# Patient Record
Sex: FEMALE | Race: WHITE | NOT HISPANIC OR LATINO | Employment: OTHER | URBAN - METROPOLITAN AREA
[De-identification: names, ages, dates, MRNs, and addresses within clinical notes are randomized per-mention and may not be internally consistent; named-entity substitution may affect disease eponyms.]

---

## 2018-03-14 ENCOUNTER — TRANSFERRED RECORDS (OUTPATIENT)
Dept: HEALTH INFORMATION MANAGEMENT | Facility: CLINIC | Age: 63
End: 2018-03-14

## 2019-02-27 ENCOUNTER — TELEPHONE (OUTPATIENT)
Dept: DERMATOLOGY | Facility: CLINIC | Age: 64
End: 2019-02-27

## 2019-02-27 NOTE — TELEPHONE ENCOUNTER
[facility] Call Center    Phone Message    May a detailed message be left on voicemail: yes    Reason for Call: Other: Pt would like to inquire more about the other clinics in [facility] that Dr Soto works at.  Please have Dr Nunez staff call the Pt.     Action Taken: Message routed to:  [facility] dermatology\  
67

## 2019-02-28 ENCOUNTER — DOCUMENTATION ONLY (OUTPATIENT)
Dept: CARE COORDINATION | Facility: CLINIC | Age: 64
End: 2019-02-28

## 2019-03-29 ENCOUNTER — OFFICE VISIT (OUTPATIENT)
Dept: DERMATOLOGY | Facility: CLINIC | Age: 64
End: 2019-03-29
Payer: COMMERCIAL

## 2019-03-29 DIAGNOSIS — L90.5 SKIN SCAR: ICD-10-CM

## 2019-03-29 DIAGNOSIS — Z86.018 HISTORY OF DYSPLASTIC NEVUS: Primary | ICD-10-CM

## 2019-03-29 DIAGNOSIS — L30.9 DERMATITIS: ICD-10-CM

## 2019-03-29 DIAGNOSIS — D22.9 MULTIPLE BENIGN MELANOCYTIC NEVI: ICD-10-CM

## 2019-03-29 DIAGNOSIS — L50.9 HIVES: ICD-10-CM

## 2019-03-29 RX ORDER — TRIAMCINOLONE ACETONIDE 1 MG/G
OINTMENT TOPICAL 2 TIMES DAILY
Qty: 80 G | Refills: 3 | Status: SHIPPED | OUTPATIENT
Start: 2019-03-29 | End: 2021-08-03

## 2019-03-29 RX ORDER — AMLODIPINE BESYLATE 5 MG/1
TABLET ORAL
COMMUNITY
Start: 2019-03-05

## 2019-03-29 RX ORDER — CYCLOSPORINE 0.5 MG/ML
EMULSION OPHTHALMIC
COMMUNITY
Start: 2019-02-21

## 2019-03-29 RX ORDER — ESCITALOPRAM OXALATE 10 MG/1
TABLET ORAL
COMMUNITY
Start: 2019-03-05

## 2019-03-29 RX ORDER — CELECOXIB 200 MG/1
CAPSULE ORAL
COMMUNITY
Start: 2019-03-05

## 2019-03-29 RX ORDER — LOSARTAN POTASSIUM 50 MG/1
50 TABLET ORAL DAILY
COMMUNITY

## 2019-03-29 RX ORDER — ESTRADIOL 0.1 MG/G
CREAM VAGINAL
COMMUNITY
Start: 2010-09-15 | End: 2020-07-19

## 2019-03-29 RX ORDER — HYDROCHLOROTHIAZIDE 12.5 MG/1
25 TABLET ORAL DAILY
COMMUNITY

## 2019-03-29 ASSESSMENT — PAIN SCALES - GENERAL: PAINLEVEL: NO PAIN (0)
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
## 2019-03-29 NOTE — NURSING NOTE
"Chief Complaint   Patient presents with     Skin Check     Kalie is here today for a skin check. No particular concerns today. Has had \"pre-cancerous cell excisisions\" in the past by her Derm in [place].       Brittney Li LPN    "
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
## 2019-04-03 ENCOUNTER — RECORDS - HEALTHEAST (OUTPATIENT)
Dept: LAB | Facility: CLINIC | Age: 64
End: 2019-04-03

## 2019-04-03 LAB
ANION GAP SERPL CALCULATED.3IONS-SCNC: 10 MMOL/L (ref 5–18)
BUN SERPL-MCNC: 11 MG/DL (ref 8–22)
CALCIUM SERPL-MCNC: 10 MG/DL (ref 8.5–10.5)
CHLORIDE BLD-SCNC: 103 MMOL/L (ref 98–107)
CHOLEST SERPL-MCNC: 260 MG/DL
CO2 SERPL-SCNC: 27 MMOL/L (ref 22–31)
CREAT SERPL-MCNC: 0.76 MG/DL (ref 0.6–1.1)
FASTING STATUS PATIENT QL REPORTED: ABNORMAL
GFR SERPL CREATININE-BSD FRML MDRD: >60 ML/MIN/1.73M2
GLUCOSE BLD-MCNC: 87 MG/DL (ref 70–125)
HDLC SERPL-MCNC: 55 MG/DL
LDLC SERPL CALC-MCNC: 178 MG/DL
POTASSIUM BLD-SCNC: 3.9 MMOL/L (ref 3.5–5)
SODIUM SERPL-SCNC: 140 MMOL/L (ref 136–145)
TRIGL SERPL-MCNC: 135 MG/DL
TSH SERPL DL<=0.005 MIU/L-ACNC: 2.53 UIU/ML (ref 0.3–5)

## 2019-04-19 ENCOUNTER — HEALTH MAINTENANCE LETTER (OUTPATIENT)
Age: 64
End: 2019-04-19

## 2019-11-20 ENCOUNTER — RECORDS - HEALTHEAST (OUTPATIENT)
Dept: LAB | Facility: CLINIC | Age: 64
End: 2019-11-20

## 2019-11-20 LAB — POTASSIUM BLD-SCNC: 4 MMOL/L (ref 3.5–5)

## 2020-03-11 ENCOUNTER — HEALTH MAINTENANCE LETTER (OUTPATIENT)
Age: 65
End: 2020-03-11

## 2020-03-18 ENCOUNTER — MYC MEDICAL ADVICE (OUTPATIENT)
Dept: DERMATOLOGY | Facility: CLINIC | Age: 65
End: 2020-03-18

## 2020-03-19 ENCOUNTER — DOCUMENTATION ONLY (OUTPATIENT)
Dept: CARE COORDINATION | Facility: CLINIC | Age: 65
End: 2020-03-19

## 2020-05-20 ENCOUNTER — RECORDS - HEALTHEAST (OUTPATIENT)
Dept: LAB | Facility: CLINIC | Age: 65
End: 2020-05-20

## 2020-05-20 LAB
ANION GAP SERPL CALCULATED.3IONS-SCNC: 9 MMOL/L (ref 5–18)
BUN SERPL-MCNC: 9 MG/DL (ref 8–22)
CALCIUM SERPL-MCNC: 10.3 MG/DL (ref 8.5–10.5)
CHLORIDE BLD-SCNC: 99 MMOL/L (ref 98–107)
CHOLEST SERPL-MCNC: 320 MG/DL
CO2 SERPL-SCNC: 29 MMOL/L (ref 22–31)
CREAT SERPL-MCNC: 0.79 MG/DL (ref 0.6–1.1)
FASTING STATUS PATIENT QL REPORTED: ABNORMAL
GFR SERPL CREATININE-BSD FRML MDRD: >60 ML/MIN/1.73M2
GLUCOSE BLD-MCNC: 87 MG/DL (ref 70–125)
HDLC SERPL-MCNC: 66 MG/DL
LDLC SERPL CALC-MCNC: 212 MG/DL
POTASSIUM BLD-SCNC: 3.8 MMOL/L (ref 3.5–5)
SODIUM SERPL-SCNC: 137 MMOL/L (ref 136–145)
TRIGL SERPL-MCNC: 211 MG/DL

## 2020-05-29 ENCOUNTER — RECORDS - HEALTHEAST (OUTPATIENT)
Dept: LAB | Facility: CLINIC | Age: 65
End: 2020-05-29

## 2020-05-29 LAB
CHOLEST SERPL-MCNC: 314 MG/DL
FASTING STATUS PATIENT QL REPORTED: YES
HDLC SERPL-MCNC: 56 MG/DL
LDLC SERPL CALC-MCNC: 219 MG/DL
TRIGL SERPL-MCNC: 197 MG/DL

## 2020-07-17 ENCOUNTER — OFFICE VISIT (OUTPATIENT)
Dept: DERMATOLOGY | Facility: CLINIC | Age: 65
End: 2020-07-17
Payer: COMMERCIAL

## 2020-07-17 DIAGNOSIS — Z86.018 HISTORY OF DYSPLASTIC NEVUS: ICD-10-CM

## 2020-07-17 DIAGNOSIS — L82.1 SEBORRHEIC KERATOSIS: ICD-10-CM

## 2020-07-17 DIAGNOSIS — L82.0 INFLAMED SEBORRHEIC KERATOSIS: Primary | ICD-10-CM

## 2020-07-17 DIAGNOSIS — D22.9 MULTIPLE BENIGN MELANOCYTIC NEVI: ICD-10-CM

## 2020-07-17 RX ORDER — ATORVASTATIN CALCIUM 20 MG/1
10 TABLET, FILM COATED ORAL
COMMUNITY
Start: 2020-06-24

## 2020-07-17 ASSESSMENT — PAIN SCALES - GENERAL: PAINLEVEL: NO PAIN (0)

## 2020-07-17 NOTE — PROGRESS NOTES
[facility] Dermatology Note      Dermatology Problem List:  1. Hx of dysplastic nevi, removed by outside derm in [place]  2. Multiple clinically benign nevi  3. Mild hand dermatitis, resolved  - used OTC HC PRN  - used  triamcinolone 0.1% BID PRN  4. Seborrheic keratosis  Has multiple SK on breasts and right medial elbow  - Cryotherapy of right medial elbow 1 SK    Encounter Date: Jul 17, 2020    CC:   FBSE     History of Present Illness:  Ms. Kalie Hoang is a 64 year old female who has a h/o dysplastic nevi, removed by dermatologist in [place], presenting for yearly visit.    She was last seen for FBSE on 3/2019, at which point she had mild hand dermatitis and was told to use OTC HC PRN and triamcinolone 0.1% BID daily. She had multiple clinically benign nevi on her face, trunk and extremities. She was also concerned cosmetically about her anterior neck scar from an emergent surgery, for which cosmetic derm consult was placed for possible revision/treatment options.    Today, has no complaints of hand dermatitis, has not needed OTC HC or triamcinolone 0.1% BID daily. Patient thinks that winter dryness that may have caused the hand issue. She also had dry lips and used triamcinolone ointment, after which the dry lips went away.     She has a new lesion (brown raised roughy, scaly lesion) on her right medial elbow, which per  has been there for 1-2 years. Also has a brown oily raised lesion on her right upper breast, which was noticed 1-2 months ago. Both new lesions are non-tender.    She ended up not going for scar revision surgery, and is using acupuncture for the redness, which helps.     Past Medical History:   There is no problem list on file for this patient.    No past medical history on file.  No past surgical history on file.    Social History:  Patient reports that she has never smoked. She has never used smokeless tobacco.    Family History:  No family history on  file.    Medications:  Current Outpatient Medications   Medication Sig Dispense Refill     amLODIPine (NORVASC) 5 MG tablet        celecoxib (CELEBREX) 200 MG capsule        escitalopram (LEXAPRO) 10 MG tablet        esomeprazole (NEXIUM) 20 MG DR capsule        estradiol (ESTRACE VAGINAL) 0.1 MG/GM vaginal cream        hydrochlorothiazide (HYDRODIURIL) 12.5 MG tablet Take 25 mg by mouth daily       losartan (COZAAR) 50 MG tablet Take 50 mg by mouth daily       RESTASIS 0.05 % ophthalmic emulsion        triamcinolone (KENALOG) 0.1 % external ointment Apply topically 2 times daily 80 g 3        Allergies   Allergen Reactions     Bactrim      Ciprofloxacin Muscle Pain (Myalgia)     Macrobid  [Nitrofurantoin Macrocrystal] Muscle Pain (Myalgia)     Codeine Palpitations       Review of Systems:  -Constitutional: Otherwise feeling well today, in usual state of health.  -HEENT: Patient denies nonhealing oral sores.  -Skin: As above in HPI. No additional skin concerns.  Patient in normal state of health and is feeling well on complete ROS. No fevers, cough, rash, GI upset, headaches, rhinorrhea, or other rashes.      Physical exam:  Vitals: There were no vitals taken for this visit.  GEN: This is a well developed, well-nourished female in no acute distress, in a pleasant mood.   NEURO: A/Ox3  MS: No joint deformity  PSYCH: Normal mood/affect  HEENT: Conjunctivae clear  Pulm: Breathing comfortably on RA  Abd: No abdominal distension  CV: Extremities warm and well perfused     SKIN: Total skin excluding the undergarment areas was performed. The exam included the head/face, neck, both arms, chest, back, abdomen, both legs, digits and/or nails.   - FP II  - There are dome shaped bright red papules on the trunk.   - Scarring of midline anterior neck 2/2 previous emergent neck surgery  - Well-healed scars in areas of previous dysplastic nevi excisions (4 total)  - Multiple regular brown pigmented macules and papules <6 mm are  identified on the trunk, extremities without concerning findings on dermoscopy.   - Multiple skin tags in extremities and back  - There are fine lines and dyspigmentation on sun exposed areas of the face and chest.  - Scattered brown macules on sun exposed areas.  - There are waxy stuck on tan to brown papules on the trunk, extremities, and breasts  - No other lesions of concern on areas examined.     Impression/Plan:  1. Multiple clinically benign nevi on the face, trunk, extremities    ABCDs of melanoma were discussed and self skin checks were advised. , Sun precaution was advised including the use of sun screens of SPF 30 or higher, sun protective clothing, and avoidance of tanning beds.     2. Hx of dysplastic nevi, removed by dermatologist in [place]  Records scanned.     Sun avoidance, sunscreen use as above     3. Mild hand dermatitis, resolved    4. Seborrheic keratoses: Benign hyperkeratotic papules and plaques. No treatment advised unless irritation occurs.     5. Irritated seborrheic keratosis x1 Lesion(s) on the elbow treated with liquid nitrogen x2 10 sec freeze cycles. Wound info provided.        CC Referred Self, MD  No address on file on close of this encounter.    Follow-up 1 year.      This patient was staffed and discussed with Dr. Soto.    Tori Fletcher MD  PGY2  IM resident  [phone]    I have personally examined this patient and agree with Dr. Fletcher's documentation and plan of care. I have reviewed and amended the resident's note above. The documentation accurately reflects my clinical observations, diagnoses, treatment and follow-up plans. I performed the procedure(s).     Tori Soto MD  Dermatology Staff

## 2020-07-17 NOTE — LETTER
7/17/2020       RE: Kalie Hoang  [address]  [place] [address]     Dear Colleague,    Thank you for referring your patient, Kalie Hoang, to the [facility] DERMATOLOGY at [facility]. Please see a copy of my visit note below.    [facility] Dermatology Note      Dermatology Problem List:  1. Hx of dysplastic nevi, removed by outside derm in [place]  2. Multiple clinically benign nevi  3. Mild hand dermatitis, resolved  - used OTC HC PRN  - used  triamcinolone 0.1% BID PRN  4. Seborrheic keratosis  Has multiple SK on breasts and right medial elbow  - Cryotherapy of right medial elbow 1 SK    Encounter Date: Jul 17, 2020    CC:   FBSE     History of Present Illness:  Ms. Kalie Hoang is a 64 year old female who has a h/o dysplastic nevi, removed by dermatologist in [place], presenting for yearly visit.    She was last seen for FBSE on 3/2019, at which point she had mild hand dermatitis and was told to use OTC HC PRN and triamcinolone 0.1% BID daily. She had multiple clinically benign nevi on her face, trunk and extremities. She was also concerned cosmetically about her anterior neck scar from an emergent surgery, for which cosmetic derm consult was placed for possible revision/treatment options.    Today, has no complaints of hand dermatitis, has not needed OTC HC or triamcinolone 0.1% BID daily. Patient thinks that winter dryness that may have caused the hand issue. She also had dry lips and used triamcinolone ointment, after which the dry lips went away.     She has a new lesion (brown raised roughy, scaly lesion) on her right medial elbow, which per  has been there for 1-2 years. Also has a brown oily raised lesion on her right upper breast, which was noticed 1-2 months ago. Both new lesions are non-tender.    She ended up not going for scar revision surgery, and is using acupuncture for the redness, which helps.     Past  Medical History:   There is no problem list on file for this patient.    No past medical history on file.  No past surgical history on file.    Social History:  Patient reports that she has never smoked. She has never used smokeless tobacco.    Family History:  No family history on file.    Medications:  Current Outpatient Medications   Medication Sig Dispense Refill     amLODIPine (NORVASC) 5 MG tablet        celecoxib (CELEBREX) 200 MG capsule        escitalopram (LEXAPRO) 10 MG tablet        esomeprazole (NEXIUM) 20 MG DR capsule        estradiol (ESTRACE VAGINAL) 0.1 MG/GM vaginal cream        hydrochlorothiazide (HYDRODIURIL) 12.5 MG tablet Take 25 mg by mouth daily       losartan (COZAAR) 50 MG tablet Take 50 mg by mouth daily       RESTASIS 0.05 % ophthalmic emulsion        triamcinolone (KENALOG) 0.1 % external ointment Apply topically 2 times daily 80 g 3        Allergies   Allergen Reactions     Bactrim      Ciprofloxacin Muscle Pain (Myalgia)     Macrobid  [Nitrofurantoin Macrocrystal] Muscle Pain (Myalgia)     Codeine Palpitations       Review of Systems:  -Constitutional: Otherwise feeling well today, in usual state of health.  -HEENT: Patient denies nonhealing oral sores.  -Skin: As above in HPI. No additional skin concerns.  Patient in normal state of health and is feeling well on complete ROS. No fevers, cough, rash, GI upset, headaches, rhinorrhea, or other rashes.      Physical exam:  Vitals: There were no vitals taken for this visit.  GEN: This is a well developed, well-nourished female in no acute distress, in a pleasant mood.   NEURO: A/Ox3  MS: No joint deformity  PSYCH: Normal mood/affect  HEENT: Conjunctivae clear  Pulm: Breathing comfortably on RA  Abd: No abdominal distension  CV: Extremities warm and well perfused     SKIN: Total skin excluding the undergarment areas was performed. The exam included the head/face, neck, both arms, chest, back, abdomen, both legs, digits and/or nails.   -  FP II  - There are dome shaped bright red papules on the trunk.   - Scarring of midline anterior neck 2/2 previous emergent neck surgery  - Well-healed scars in areas of previous dysplastic nevi excisions (4 total)  - Multiple regular brown pigmented macules and papules <6 mm are identified on the trunk, extremities without concerning findings on dermoscopy.   - Multiple skin tags in extremities and back  - There are fine lines and dyspigmentation on sun exposed areas of the face and chest.  - Scattered brown macules on sun exposed areas.  - There are waxy stuck on tan to brown papules on the trunk, extremities, and breasts  - No other lesions of concern on areas examined.     Impression/Plan:  1. Multiple clinically benign nevi on the face, trunk, extremities    ABCDs of melanoma were discussed and self skin checks were advised. , Sun precaution was advised including the use of sun screens of SPF 30 or higher, sun protective clothing, and avoidance of tanning beds.     2. Hx of dysplastic nevi, removed by dermatologist in [place]  Records scanned.     Sun avoidance, sunscreen use as above     3. Mild hand dermatitis, resolved    4. Seborrheic keratoses: Benign hyperkeratotic papules and plaques. No treatment advised unless irritation occurs.     5. Irritated seborrheic keratosis x1 Lesion(s) on the elbow treated with liquid nitrogen x2 10 sec freeze cycles. Wound info provided.        CC Referred Self, MD  No address on file on close of this encounter.    Follow-up 1 year.      This patient was staffed and discussed with Dr. Soto.    Tori Fletcher MD  PGY2  IM resident  [phone]    I have personally examined this patient and agree with Dr. Fletcher's documentation and plan of care. I have reviewed and amended the resident's note above. The documentation accurately reflects my clinical observations, diagnoses, treatment and follow-up plans. I performed the procedure(s).     Tori Soto MD  Dermatology  Staff      Again, thank you for allowing me to participate in the care of your patient.      Sincerely,    Tori Soto MD

## 2020-07-17 NOTE — PATIENT INSTRUCTIONS
You were seen for full skin exam today. Thanks for coming.     We froze off your seborrheic keratosis (on your right arm). They will fall off in the coming days. Discussed sun precautions was advised including the use of sun screens of SPF 30 or higher, sun protective clothing, and avoidance of tanning beds, since you have had history of dysplastic nevi taken out in [place].

## 2020-07-17 NOTE — NURSING NOTE
Chief Complaint   Patient presents with     Derm Problem     Kalie is here today for a skin check, pt states has concern on her right inner elbow     Mojgan Hall LPN

## 2020-11-18 ENCOUNTER — RECORDS - HEALTHEAST (OUTPATIENT)
Dept: LAB | Facility: CLINIC | Age: 65
End: 2020-11-18

## 2020-11-18 LAB
ANION GAP SERPL CALCULATED.3IONS-SCNC: 11 MMOL/L (ref 5–18)
BUN SERPL-MCNC: 14 MG/DL (ref 8–22)
CALCIUM SERPL-MCNC: 9.9 MG/DL (ref 8.5–10.5)
CHLORIDE BLD-SCNC: 101 MMOL/L (ref 98–107)
CHOLEST SERPL-MCNC: 178 MG/DL
CO2 SERPL-SCNC: 27 MMOL/L (ref 22–31)
CREAT SERPL-MCNC: 0.75 MG/DL (ref 0.6–1.1)
FASTING STATUS PATIENT QL REPORTED: ABNORMAL
GFR SERPL CREATININE-BSD FRML MDRD: >60 ML/MIN/1.73M2
GLUCOSE BLD-MCNC: 93 MG/DL (ref 70–125)
HDLC SERPL-MCNC: 63 MG/DL
LDLC SERPL CALC-MCNC: 82 MG/DL
POTASSIUM BLD-SCNC: 4.2 MMOL/L (ref 3.5–5)
SODIUM SERPL-SCNC: 139 MMOL/L (ref 136–145)
TRIGL SERPL-MCNC: 166 MG/DL

## 2021-01-03 ENCOUNTER — HEALTH MAINTENANCE LETTER (OUTPATIENT)
Age: 66
End: 2021-01-03

## 2021-04-25 ENCOUNTER — HEALTH MAINTENANCE LETTER (OUTPATIENT)
Age: 66
End: 2021-04-25

## 2021-08-03 ENCOUNTER — OFFICE VISIT (OUTPATIENT)
Dept: DERMATOLOGY | Facility: CLINIC | Age: 66
End: 2021-08-03
Payer: COMMERCIAL

## 2021-08-03 DIAGNOSIS — L50.9 HIVES: ICD-10-CM

## 2021-08-03 DIAGNOSIS — D22.9 MULTIPLE BENIGN MELANOCYTIC NEVI: ICD-10-CM

## 2021-08-03 DIAGNOSIS — B35.1 ONYCHOMYCOSIS: Primary | ICD-10-CM

## 2021-08-03 DIAGNOSIS — L82.1 SEBORRHEIC KERATOSIS: ICD-10-CM

## 2021-08-03 DIAGNOSIS — Z86.018 HISTORY OF DYSPLASTIC NEVUS: ICD-10-CM

## 2021-08-03 PROCEDURE — 99214 OFFICE O/P EST MOD 30 MIN: CPT | Performed by: DERMATOLOGY

## 2021-08-03 RX ORDER — CICLOPIROX 80 MG/ML
SOLUTION TOPICAL
Qty: 6.6 ML | Refills: 3 | Status: SHIPPED | OUTPATIENT
Start: 2021-08-03 | End: 2023-12-05

## 2021-08-03 RX ORDER — TRIAMCINOLONE ACETONIDE 1 MG/G
OINTMENT TOPICAL
Qty: 80 G | Refills: 3 | Status: SHIPPED | OUTPATIENT
Start: 2021-08-03

## 2021-08-03 RX ORDER — CETIRIZINE HYDROCHLORIDE 10 MG/1
10 TABLET ORAL DAILY
COMMUNITY

## 2021-08-03 NOTE — LETTER
8/3/2021      RE: Kalie Hoang  [address]  [place] [address]               [facility] Dermatology Note      Dermatology Problem List:  1. Hx of dysplastic nevi, removed by outside derm in [place]  2. Multiple clinically benign nevi  3. Mild hand dermatitis, resolved  - used OTC HC PRN  - used  triamcinolone 0.1% BID PRN  4. Seborrheic keratosis  Has multiple SK on breasts and right medial elbow  - Cryotherapy of right medial elbow 1 SEBORRHEIC KERATOSIS  5. Onychomycosis      Encounter Date: Aug 3, 2021    CC:   FBSE     History of Present Illness:  Ms. Kalie Hoang is a 65 year old female who has a h/o dysplastic nevi, removed by dermatologist in [place], presenting for yearly visit. Last seen in 7/2020. Notes no significant skin changes. Has lip dermatitis that tends to flare with lipstick. Notes that the hand dermatitis is  Improved. Had a presumed bug bite on the L thigh that seems to now be crusted.     Past Medical History:   There is no problem list on file for this patient.    History reviewed. No pertinent past medical history.  History reviewed. No pertinent surgical history.    Social History:  Patient reports that she has never smoked. She has never used smokeless tobacco.    Family History:  History reviewed. No pertinent family history.    Medications:  Current Outpatient Medications   Medication Sig Dispense Refill     amLODIPine (NORVASC) 5 MG tablet        atorvastatin (LIPITOR) 20 MG tablet 10 mg        celecoxib (CELEBREX) 200 MG capsule        cetirizine (ZYRTEC) 10 MG tablet Take 10 mg by mouth daily       escitalopram (LEXAPRO) 10 MG tablet        esomeprazole (NEXIUM) 20 MG DR capsule        hydrochlorothiazide (HYDRODIURIL) 12.5 MG tablet Take 25 mg by mouth daily       losartan (COZAAR) 50 MG tablet Take 50 mg by mouth daily       RESTASIS 0.05 % ophthalmic emulsion        triamcinolone (KENALOG) 0.1 % external ointment Apply topically 2  times daily 80 g 3        Allergies   Allergen Reactions     Bactrim      Ciprofloxacin Muscle Pain (Myalgia)     Macrobid  [Nitrofurantoin Macrocrystal] Muscle Pain (Myalgia)     Codeine Palpitations       Review of Systems:  -Constitutional: Otherwise feeling well today, in usual state of health.  -HEENT: Patient denies nonhealing oral sores.  -Skin: As above in HPI. No additional skin concerns.  Patient in normal state of health and is feeling well on complete ROS. No fevers, cough, rash, GI upset, headaches, rhinorrhea, or other rashes.      Physical exam:  Vitals: There were no vitals taken for this visit.  GEN: This is a well developed, well-nourished female in no acute distress, in a pleasant mood.   NEURO: A/Ox3  MS: No joint deformity  PSYCH: Normal mood/affect  HEENT: Conjunctivae clear  Pulm: Breathing comfortably on RA  Abd: No abdominal distension  CV: Extremities warm and well perfused     SKIN: Total skin excluding the undergarment areas was performed. The exam included the head/face, neck, both arms, chest, back, abdomen, both legs, digits and/or nails.   - FP II  - There are dome shaped bright red papules on the trunk.   - Scarring of midline anterior neck 2/2 previous emergent neck surgery  - Well-healed scars in areas of previous dysplastic nevi excisions (4 total)  - Multiple regular brown pigmented macules and papules <6 mm are identified on the trunk, extremities without concerning findings on dermoscopy.   - L medial breast with approx 8 mm brown papule with dark brown macules at the inferior aspect  - Multiple skin tags in extremities and back  - There are fine lines and dyspigmentation on sun exposed areas of the face and chest.  - Scattered brown macules on sun exposed areas.  - There are waxy stuck on tan to brown papules on the trunk, extremities, and breasts  - No other lesions of concern on areas examined.   - Pink crusted papule on the L lateral thigh 2 mm   - L great toenail plates  with distal onycholysis    Impression/Plan:  1. Multiple clinically benign nevi on the face, trunk, extremities  ABCDs of melanoma were discussed and self skin checks were advised. , Sun precaution was advised including the use of sun screens of SPF 30 or higher, sun protective clothing, and avoidance of tanning beds.    2. History of dysplastic nevus, no recurrence noted. Monitoring lesion on the L breast, stable for many years per patient.     3. Onychomycosis: Pt previously on oral terbinafine with poor side-effect profile. Rx provided for Penlac, noted that this would not result in cure, but likely would slow spread.     4. Seborrheic keratoses: Benign hyperkeratotic papules and plaques. No treatment advised unless irritation occurs.        CC Referred Self, MD  No address on file on close of this encounter.    Follow-up 1 year.        Tori Soto MD  Dermatology Staff

## 2021-08-03 NOTE — LETTER
8/3/2021      RE: Kalie Hoang  [address]  [place] [address]               [facility] Dermatology Note      Dermatology Problem List:  1. Hx of dysplastic nevi, removed by outside derm in [place]  2. Multiple clinically benign nevi  3. Mild hand dermatitis, resolved  - used OTC HC PRN  - used  triamcinolone 0.1% BID PRN  4. Seborrheic keratosis  Has multiple SK on breasts and right medial elbow  - Cryotherapy of right medial elbow 1 SEBORRHEIC KERATOSIS  5. Onychomycosis      Encounter Date: Aug 3, 2021    CC:   FBSE     History of Present Illness:  Ms. Kalie Hoang is a 65 year old female who has a h/o dysplastic nevi, removed by dermatologist in [place], presenting for yearly visit. Last seen in 7/2020. Notes no significant skin changes. Has lip dermatitis that tends to flare with lipstick. Notes that the hand dermatitis is  Improved. Had a presumed bug bite on the L thigh that seems to now be crusted.     Past Medical History:   There is no problem list on file for this patient.    History reviewed. No pertinent past medical history.  History reviewed. No pertinent surgical history.    Social History:  Patient reports that she has never smoked. She has never used smokeless tobacco.    Family History:  History reviewed. No pertinent family history.    Medications:  Current Outpatient Medications   Medication Sig Dispense Refill     amLODIPine (NORVASC) 5 MG tablet        atorvastatin (LIPITOR) 20 MG tablet 10 mg        celecoxib (CELEBREX) 200 MG capsule        cetirizine (ZYRTEC) 10 MG tablet Take 10 mg by mouth daily       escitalopram (LEXAPRO) 10 MG tablet        esomeprazole (NEXIUM) 20 MG DR capsule        hydrochlorothiazide (HYDRODIURIL) 12.5 MG tablet Take 25 mg by mouth daily       losartan (COZAAR) 50 MG tablet Take 50 mg by mouth daily       RESTASIS 0.05 % ophthalmic emulsion        triamcinolone (KENALOG) 0.1 % external ointment Apply topically 2  times daily 80 g 3        Allergies   Allergen Reactions     Bactrim      Ciprofloxacin Muscle Pain (Myalgia)     Macrobid  [Nitrofurantoin Macrocrystal] Muscle Pain (Myalgia)     Codeine Palpitations       Review of Systems:  -Constitutional: Otherwise feeling well today, in usual state of health.  -HEENT: Patient denies nonhealing oral sores.  -Skin: As above in HPI. No additional skin concerns.  Patient in normal state of health and is feeling well on complete ROS. No fevers, cough, rash, GI upset, headaches, rhinorrhea, or other rashes.      Physical exam:  Vitals: There were no vitals taken for this visit.  GEN: This is a well developed, well-nourished female in no acute distress, in a pleasant mood.   NEURO: A/Ox3  MS: No joint deformity  PSYCH: Normal mood/affect  HEENT: Conjunctivae clear  Pulm: Breathing comfortably on RA  Abd: No abdominal distension  CV: Extremities warm and well perfused     SKIN: Total skin excluding the undergarment areas was performed. The exam included the head/face, neck, both arms, chest, back, abdomen, both legs, digits and/or nails.   - FP II  - There are dome shaped bright red papules on the trunk.   - Scarring of midline anterior neck 2/2 previous emergent neck surgery  - Well-healed scars in areas of previous dysplastic nevi excisions (4 total)  - Multiple regular brown pigmented macules and papules <6 mm are identified on the trunk, extremities without concerning findings on dermoscopy.   - L medial breast with approx 8 mm brown papule with dark brown macules at the inferior aspect  - Multiple skin tags in extremities and back  - There are fine lines and dyspigmentation on sun exposed areas of the face and chest.  - Scattered brown macules on sun exposed areas.  - There are waxy stuck on tan to brown papules on the trunk, extremities, and breasts  - No other lesions of concern on areas examined.   - Pink crusted papule on the L lateral thigh 2 mm   - L great toenail plates  with distal onycholysis    Impression/Plan:  1. Multiple clinically benign nevi on the face, trunk, extremities  ABCDs of melanoma were discussed and self skin checks were advised. , Sun precaution was advised including the use of sun screens of SPF 30 or higher, sun protective clothing, and avoidance of tanning beds.    2. History of dysplastic nevus, no recurrence noted. Monitoring lesion on the L breast, stable for many years per patient.     3. Onychomycosis: Pt previously on oral terbinafine with poor side-effect profile. Rx provided for Penlac, noted that this would not result in cure, but likely would slow spread.     4. Seborrheic keratoses: Benign hyperkeratotic papules and plaques. No treatment advised unless irritation occurs.        CC Referred Self, MD  No address on file on close of this encounter.    Follow-up 1 year.        Tori Soto MD  Dermatology Staff        Tori Soto MD

## 2021-08-03 NOTE — PROGRESS NOTES
[facility] Dermatology Note      Dermatology Problem List:  1. Hx of dysplastic nevi, removed by outside derm in [place]  2. Multiple clinically benign nevi  3. Mild hand dermatitis, resolved  - used OTC HC PRN  - used  triamcinolone 0.1% BID PRN  4. Seborrheic keratosis  Has multiple SK on breasts and right medial elbow  - Cryotherapy of right medial elbow 1 SEBORRHEIC KERATOSIS  5. Onychomycosis      Encounter Date: Aug 3, 2021    CC:   FBSE     History of Present Illness:  Ms. Kalie Hoang is a 65 year old female who has a h/o dysplastic nevi, removed by dermatologist in [place], presenting for yearly visit. Last seen in 7/2020. Notes no significant skin changes. Has lip dermatitis that tends to flare with lipstick. Notes that the hand dermatitis is  Improved. Had a presumed bug bite on the L thigh that seems to now be crusted.     Past Medical History:   There is no problem list on file for this patient.    History reviewed. No pertinent past medical history.  History reviewed. No pertinent surgical history.    Social History:  Patient reports that she has never smoked. She has never used smokeless tobacco.    Family History:  History reviewed. No pertinent family history.    Medications:  Current Outpatient Medications   Medication Sig Dispense Refill     amLODIPine (NORVASC) 5 MG tablet        atorvastatin (LIPITOR) 20 MG tablet 10 mg        celecoxib (CELEBREX) 200 MG capsule        cetirizine (ZYRTEC) 10 MG tablet Take 10 mg by mouth daily       escitalopram (LEXAPRO) 10 MG tablet        esomeprazole (NEXIUM) 20 MG DR capsule        hydrochlorothiazide (HYDRODIURIL) 12.5 MG tablet Take 25 mg by mouth daily       losartan (COZAAR) 50 MG tablet Take 50 mg by mouth daily       RESTASIS 0.05 % ophthalmic emulsion        triamcinolone (KENALOG) 0.1 % external ointment Apply topically 2 times daily 80 g 3        Allergies   Allergen Reactions     Bactrim      Ciprofloxacin  Muscle Pain (Myalgia)     Macrobid  [Nitrofurantoin Macrocrystal] Muscle Pain (Myalgia)     Codeine Palpitations       Review of Systems:  -Constitutional: Otherwise feeling well today, in usual state of health.  -HEENT: Patient denies nonhealing oral sores.  -Skin: As above in HPI. No additional skin concerns.  Patient in normal state of health and is feeling well on complete ROS. No fevers, cough, rash, GI upset, headaches, rhinorrhea, or other rashes.      Physical exam:  Vitals: There were no vitals taken for this visit.  GEN: This is a well developed, well-nourished female in no acute distress, in a pleasant mood.   NEURO: A/Ox3  MS: No joint deformity  PSYCH: Normal mood/affect  HEENT: Conjunctivae clear  Pulm: Breathing comfortably on RA  Abd: No abdominal distension  CV: Extremities warm and well perfused     SKIN: Total skin excluding the undergarment areas was performed. The exam included the head/face, neck, both arms, chest, back, abdomen, both legs, digits and/or nails.   - FP II  - There are dome shaped bright red papules on the trunk.   - Scarring of midline anterior neck 2/2 previous emergent neck surgery  - Well-healed scars in areas of previous dysplastic nevi excisions (4 total)  - Multiple regular brown pigmented macules and papules <6 mm are identified on the trunk, extremities without concerning findings on dermoscopy.   - L medial breast with approx 8 mm brown papule with dark brown macules at the inferior aspect  - Multiple skin tags in extremities and back  - There are fine lines and dyspigmentation on sun exposed areas of the face and chest.  - Scattered brown macules on sun exposed areas.  - There are waxy stuck on tan to brown papules on the trunk, extremities, and breasts  - No other lesions of concern on areas examined.   - Pink crusted papule on the L lateral thigh 2 mm   - L great toenail plates with distal onycholysis    Impression/Plan:  1. Multiple clinically benign nevi on the  face, trunk, extremities  ABCDs of melanoma were discussed and self skin checks were advised. , Sun precaution was advised including the use of sun screens of SPF 30 or higher, sun protective clothing, and avoidance of tanning beds.    2. History of dysplastic nevus, no recurrence noted. Monitoring lesion on the L breast, stable for many years per patient.     3. Onychomycosis: Pt previously on oral terbinafine with poor side-effect profile. Rx provided for Penlac, noted that this would not result in cure, but likely would slow spread.     4. Seborrheic keratoses: Benign hyperkeratotic papules and plaques. No treatment advised unless irritation occurs.        CC Referred Self, MD  No address on file on close of this encounter.    Follow-up 1 year.        Tori Soto MD  Dermatology Staff

## 2021-08-03 NOTE — PATIENT INSTRUCTIONS
Pediatric Dermatology  [facility]  [address]  1st [place]  [address]  Phone: [phone]    Pediatric & Adult Dermatology  [facility]  [address]   2nd [address]  [facility] [address]  [phone]                  General information: Dr. Tori Soto is a board-certified dermatologist with subspecialty certification in pediatric dermatology.     Scheduling and Nurse Triage: Dr. Soto sees pediatric patients on Mondays in [place] and adult and pediatric patients on Tuesdays in [place]. The remainder of the week she practices at the [facility]. Please call the above phone numbers to schedule or to talk to a nurse.     -For scheduling at the [place] or [place] locations, or to talk to the triage nurse please call the above phone number at the clinic where you were seen.     -For medication refills, please call your pharmacy.

## 2021-10-10 ENCOUNTER — HEALTH MAINTENANCE LETTER (OUTPATIENT)
Age: 66
End: 2021-10-10

## 2021-11-17 ENCOUNTER — LAB REQUISITION (OUTPATIENT)
Dept: LAB | Facility: CLINIC | Age: 66
End: 2021-11-17
Payer: COMMERCIAL

## 2021-11-17 DIAGNOSIS — M85.80 OTHER SPECIFIED DISORDERS OF BONE DENSITY AND STRUCTURE, UNSPECIFIED SITE: ICD-10-CM

## 2021-11-17 DIAGNOSIS — E78.2 MIXED HYPERLIPIDEMIA: ICD-10-CM

## 2021-11-17 DIAGNOSIS — I10 ESSENTIAL (PRIMARY) HYPERTENSION: ICD-10-CM

## 2021-11-17 LAB
ANION GAP SERPL CALCULATED.3IONS-SCNC: 12 MMOL/L (ref 5–18)
BUN SERPL-MCNC: 10 MG/DL (ref 8–22)
CALCIUM SERPL-MCNC: 9.7 MG/DL (ref 8.5–10.5)
CHLORIDE BLD-SCNC: 101 MMOL/L (ref 98–107)
CHOLEST SERPL-MCNC: 167 MG/DL
CO2 SERPL-SCNC: 25 MMOL/L (ref 22–31)
CREAT SERPL-MCNC: 0.82 MG/DL (ref 0.6–1.1)
GFR SERPL CREATININE-BSD FRML MDRD: 75 ML/MIN/1.73M2
GLUCOSE BLD-MCNC: 96 MG/DL (ref 70–125)
HDLC SERPL-MCNC: 61 MG/DL
LDLC SERPL CALC-MCNC: 74 MG/DL
POTASSIUM BLD-SCNC: 3.9 MMOL/L (ref 3.5–5)
SODIUM SERPL-SCNC: 138 MMOL/L (ref 136–145)
TRIGL SERPL-MCNC: 158 MG/DL

## 2021-11-17 PROCEDURE — 80048 BASIC METABOLIC PNL TOTAL CA: CPT | Mod: ORL | Performed by: FAMILY MEDICINE

## 2021-11-17 PROCEDURE — 82652 VIT D 1 25-DIHYDROXY: CPT | Mod: ORL | Performed by: FAMILY MEDICINE

## 2021-11-17 PROCEDURE — 82306 VITAMIN D 25 HYDROXY: CPT | Mod: ORL | Performed by: FAMILY MEDICINE

## 2021-11-17 PROCEDURE — 80061 LIPID PANEL: CPT | Mod: ORL | Performed by: FAMILY MEDICINE

## 2021-11-18 LAB
1,25(OH)2D SERPL-MCNC: 34.9 PG/ML (ref 19.9–79.3)
DEPRECATED CALCIDIOL+CALCIFEROL SERPL-MC: 49 UG/L (ref 30–80)

## 2021-12-05 ENCOUNTER — HEALTH MAINTENANCE LETTER (OUTPATIENT)
Age: 66
End: 2021-12-05

## 2022-05-18 ENCOUNTER — LAB REQUISITION (OUTPATIENT)
Dept: LAB | Facility: CLINIC | Age: 67
End: 2022-05-18
Payer: COMMERCIAL

## 2022-05-18 DIAGNOSIS — R53.83 OTHER FATIGUE: ICD-10-CM

## 2022-05-18 LAB
FERRITIN SERPL-MCNC: 9 NG/ML (ref 10–130)
VIT B12 SERPL-MCNC: 987 PG/ML (ref 213–816)

## 2022-05-18 PROCEDURE — 82728 ASSAY OF FERRITIN: CPT | Mod: ORL | Performed by: FAMILY MEDICINE

## 2022-05-18 PROCEDURE — 82607 VITAMIN B-12: CPT | Mod: ORL | Performed by: FAMILY MEDICINE

## 2022-05-21 ENCOUNTER — HEALTH MAINTENANCE LETTER (OUTPATIENT)
Age: 67
End: 2022-05-21

## 2022-09-18 ENCOUNTER — HEALTH MAINTENANCE LETTER (OUTPATIENT)
Age: 67
End: 2022-09-18

## 2022-11-08 ENCOUNTER — OFFICE VISIT (OUTPATIENT)
Dept: DERMATOLOGY | Facility: CLINIC | Age: 67
End: 2022-11-08
Payer: COMMERCIAL

## 2022-11-08 DIAGNOSIS — Z86.018 HISTORY OF DYSPLASTIC NEVUS: ICD-10-CM

## 2022-11-08 DIAGNOSIS — D22.9 MULTIPLE BENIGN MELANOCYTIC NEVI: Primary | ICD-10-CM

## 2022-11-08 DIAGNOSIS — B35.1 ONYCHOMYCOSIS: ICD-10-CM

## 2022-11-08 PROCEDURE — 99213 OFFICE O/P EST LOW 20 MIN: CPT | Performed by: DERMATOLOGY

## 2022-11-08 RX ORDER — ALPRAZOLAM 0.25 MG/1
TABLET, ORALLY DISINTEGRATING ORAL
COMMUNITY

## 2022-11-08 RX ORDER — DICYCLOMINE HYDROCHLORIDE 10 MG/1
10 CAPSULE ORAL PRN
COMMUNITY

## 2022-11-08 RX ORDER — FAMOTIDINE 40 MG/1
40 TABLET, FILM COATED ORAL PRN
COMMUNITY

## 2022-11-08 NOTE — PATIENT INSTRUCTIONS
Pediatric Dermatology  [facility]  [address]  1st [place]  [address]  Phone: [phone]    Pediatric & Adult Dermatology  [facility]  [address]   2nd Floor  [facility] [address]  [phone]                  General information: Dr. Tori Soto is a board-certified dermatologist with subspecialty certification in pediatric dermatology.     Scheduling and Nurse Triage: Dr. Soto sees pediatric patients on Mondays in [place] and adult and pediatric patients on Tuesdays in [place]. The remainder of the week she practices at the [facility]. Please call the above phone numbers to schedule or to talk to a nurse.     -For scheduling at the [place] or [place] locations, or to talk to the triage nurse please call the above phone number at the clinic where you were seen.     -For medication refills, please call your pharmacy.

## 2022-11-08 NOTE — PROGRESS NOTES
[facility] Dermatology Note      Dermatology Problem List:  1. Hx of dysplastic nevi, removed by outside derm in [place]  2. Multiple clinically benign nevi  3. Mild hand dermatitis, resolved  - used OTC HC PRN  - used  triamcinolone 0.1% BID PRN  4. Seborrheic keratosis  Has multiple SK on breasts and right medial elbow  - Cryotherapy of right medial elbow 1 SEBORRHEIC KERATOSIS  5. Onychomycosis      Encounter Date: Nov 8, 2022    CC:   FBSE     History of Present Illness:  Ms. Kalie Hoang is a 66 year old female who has a h/o dysplastic nevi, removed by dermatologist in [place], presenting for yearly visit. No lesions of concern. Using penlac on the nails, but not sure if this is helping.     Past Medical History:   There is no problem list on file for this patient.    History reviewed. No pertinent past medical history.  History reviewed. No pertinent surgical history.    Social History:  Patient reports that she has never smoked. She has never used smokeless tobacco.    Family History:  History reviewed. No pertinent family history.    Medications:  Current Outpatient Medications   Medication Sig Dispense Refill     amLODIPine (NORVASC) 5 MG tablet        atorvastatin (LIPITOR) 20 MG tablet 10 mg        celecoxib (CELEBREX) 200 MG capsule        cetirizine (ZYRTEC) 10 MG tablet Take 10 mg by mouth daily       dicyclomine (BENTYL) 10 MG capsule Take 10 mg by mouth as needed       escitalopram (LEXAPRO) 10 MG tablet        esomeprazole (NEXIUM) 20 MG DR capsule        famotidine (PEPCID) 40 MG tablet Take 40 mg by mouth as needed for heartburn       hydrochlorothiazide (HYDRODIURIL) 12.5 MG tablet Take 25 mg by mouth daily       losartan (COZAAR) 50 MG tablet Take 50 mg by mouth daily       nystatin (MYCOSTATIN) 622918 UNIT/GM external cream Twice daily to rash under breasts until clear. 30 g 3     RESTASIS 0.05 % ophthalmic emulsion        ALPRAZolam (XANAX) 0.25 MG ODT 1/2  tab(s)       ciclopirox (PENLAC) 8 % external solution Apply to adjacent skin and affected nails daily.  Remove with alcohol every 7 days, then repeat. (Patient not taking: Reported on 11/8/2022) 6.6 mL 3     triamcinolone (KENALOG) 0.1 % external ointment Twice daily to hand rash areas until clear, then as needed. Do not fill until pt calls. (Patient not taking: Reported on 11/8/2022) 80 g 3        Allergies   Allergen Reactions     Bactrim      Ciprofloxacin Muscle Pain (Myalgia)     Macrobid  [Nitrofurantoin Macrocrystal] Muscle Pain (Myalgia)     Codeine Palpitations       Review of Systems:  -Constitutional: Otherwise feeling well today, in usual state of health.  -HEENT: Patient denies nonhealing oral sores.  -Skin: As above in HPI. No additional skin concerns.  Patient in normal state of health and is feeling well on complete ROS. No fevers, cough, rash, GI upset, headaches, rhinorrhea, or other rashes.      Physical exam:  Vitals: There were no vitals taken for this visit.  GEN: This is a well developed, well-nourished female in no acute distress, in a pleasant mood.      SKIN: Total skin excluding the undergarment areas was performed. The exam included the head/face, neck, both arms, chest, back, abdomen, both legs, digits and/or nails.   - FP II  - There are dome shaped bright red papules on the trunk.   - Scarring of midline anterior neck 2/2 previous emergent neck surgery  - Well-healed scars in areas of previous dysplastic nevi excisions (4 total)  - Multiple regular brown pigmented macules and papules <6 mm are identified on the trunk, extremities without concerning findings on dermoscopy.   - L medial breast with approx 8 mm brown papule with dark brown macules at the inferior aspect  - Scattered brown macules on sun exposed areas.  - There are waxy stuck on tan to brown papules on the trunk, extremities, and breasts  - No other lesions of concern on areas examined.    - L great toenail plates with  lateral onycholysis    Impression/Plan:  1. Multiple clinically benign nevi on the face, trunk, extremities  ABCDs of melanoma were discussed and self skin checks were advised. , Sun precaution was advised including the use of sun screens of SPF 30 or higher, sun protective clothing, and avoidance of tanning beds.    2. History of dysplastic nevus, no recurrence noted. Monitoring lesion on the L breast, stable for many years per patient.     3. Onychomycosis: Pt previously on oral terbinafine with poor side-effect profile. Will stop penlac for now.     4. Seborrheic keratoses: Benign hyperkeratotic papules and plaques. No treatment advised unless irritation occurs.        CC Referred Self, MD  No address on file on close of this encounter.    Follow-up 1 year.    Tori Soto MD  Dermatology Staff

## 2022-11-08 NOTE — LETTER
11/8/2022      RE: Kalie Hoang  [address]  [place] [address]     Dear Colleague,    Thank you for the opportunity to participate in the care of your patient, Kalie Hoang, at the [facility] at [facility]. Please see a copy of my visit note below.      [facility] Dermatology Note      Dermatology Problem List:  1. Hx of dysplastic nevi, removed by outside derm in [place]  2. Multiple clinically benign nevi  3. Mild hand dermatitis, resolved  - used OTC HC PRN  - used  triamcinolone 0.1% BID PRN  4. Seborrheic keratosis  Has multiple SK on breasts and right medial elbow  - Cryotherapy of right medial elbow 1 SEBORRHEIC KERATOSIS  5. Onychomycosis      Encounter Date: Nov 8, 2022    CC:   FBSE     History of Present Illness:  Ms. Kalie Hoang is a 66 year old female who has a h/o dysplastic nevi, removed by dermatologist in [place], presenting for yearly visit. No lesions of concern. Using penlac on the nails, but not sure if this is helping.     Past Medical History:   There is no problem list on file for this patient.    History reviewed. No pertinent past medical history.  History reviewed. No pertinent surgical history.    Social History:  Patient reports that she has never smoked. She has never used smokeless tobacco.    Family History:  History reviewed. No pertinent family history.    Medications:  Current Outpatient Medications   Medication Sig Dispense Refill     amLODIPine (NORVASC) 5 MG tablet        atorvastatin (LIPITOR) 20 MG tablet 10 mg        celecoxib (CELEBREX) 200 MG capsule        cetirizine (ZYRTEC) 10 MG tablet Take 10 mg by mouth daily       dicyclomine (BENTYL) 10 MG capsule Take 10 mg by mouth as needed       escitalopram (LEXAPRO) 10 MG tablet        esomeprazole (NEXIUM) 20 MG DR capsule        famotidine (PEPCID) 40 MG tablet Take 40 mg by mouth as needed for heartburn        hydrochlorothiazide (HYDRODIURIL) 12.5 MG tablet Take 25 mg by mouth daily       losartan (COZAAR) 50 MG tablet Take 50 mg by mouth daily       nystatin (MYCOSTATIN) 528466 UNIT/GM external cream Twice daily to rash under breasts until clear. 30 g 3     RESTASIS 0.05 % ophthalmic emulsion        ALPRAZolam (XANAX) 0.25 MG ODT 1/2 tab(s)       ciclopirox (PENLAC) 8 % external solution Apply to adjacent skin and affected nails daily.  Remove with alcohol every 7 days, then repeat. (Patient not taking: Reported on 11/8/2022) 6.6 mL 3     triamcinolone (KENALOG) 0.1 % external ointment Twice daily to hand rash areas until clear, then as needed. Do not fill until pt calls. (Patient not taking: Reported on 11/8/2022) 80 g 3        Allergies   Allergen Reactions     Bactrim      Ciprofloxacin Muscle Pain (Myalgia)     Macrobid  [Nitrofurantoin Macrocrystal] Muscle Pain (Myalgia)     Codeine Palpitations       Review of Systems:  -Constitutional: Otherwise feeling well today, in usual state of health.  -HEENT: Patient denies nonhealing oral sores.  -Skin: As above in HPI. No additional skin concerns.  Patient in normal state of health and is feeling well on complete ROS. No fevers, cough, rash, GI upset, headaches, rhinorrhea, or other rashes.      Physical exam:  Vitals: There were no vitals taken for this visit.  GEN: This is a well developed, well-nourished female in no acute distress, in a pleasant mood.      SKIN: Total skin excluding the undergarment areas was performed. The exam included the head/face, neck, both arms, chest, back, abdomen, both legs, digits and/or nails.   - FP II  - There are dome shaped bright red papules on the trunk.   - Scarring of midline anterior neck 2/2 previous emergent neck surgery  - Well-healed scars in areas of previous dysplastic nevi excisions (4 total)  - Multiple regular brown pigmented macules and papules <6 mm are identified on the trunk, extremities without concerning  findings on dermoscopy.   - L medial breast with approx 8 mm brown papule with dark brown macules at the inferior aspect  - Scattered brown macules on sun exposed areas.  - There are waxy stuck on tan to brown papules on the trunk, extremities, and breasts  - No other lesions of concern on areas examined.    - L great toenail plates with lateral onycholysis    Impression/Plan:  1. Multiple clinically benign nevi on the face, trunk, extremities  ABCDs of melanoma were discussed and self skin checks were advised. , Sun precaution was advised including the use of sun screens of SPF 30 or higher, sun protective clothing, and avoidance of tanning beds.    2. History of dysplastic nevus, no recurrence noted. Monitoring lesion on the L breast, stable for many years per patient.     3. Onychomycosis: Pt previously on oral terbinafine with poor side-effect profile. Will stop penlac for now.     4. Seborrheic keratoses: Benign hyperkeratotic papules and plaques. No treatment advised unless irritation occurs.        CC Referred Self, MD  No address on file on close of this encounter.    Follow-up 1 year.    Tori Soto MD  Dermatology Staff

## 2022-11-16 ENCOUNTER — LAB REQUISITION (OUTPATIENT)
Dept: LAB | Facility: CLINIC | Age: 67
End: 2022-11-16
Payer: COMMERCIAL

## 2022-11-16 DIAGNOSIS — E78.2 MIXED HYPERLIPIDEMIA: ICD-10-CM

## 2022-11-16 DIAGNOSIS — I10 ESSENTIAL (PRIMARY) HYPERTENSION: ICD-10-CM

## 2022-11-16 PROCEDURE — 80061 LIPID PANEL: CPT | Mod: ORL | Performed by: FAMILY MEDICINE

## 2022-11-16 PROCEDURE — 80048 BASIC METABOLIC PNL TOTAL CA: CPT | Mod: ORL | Performed by: FAMILY MEDICINE

## 2022-11-17 LAB
ANION GAP SERPL CALCULATED.3IONS-SCNC: 12 MMOL/L (ref 7–15)
BUN SERPL-MCNC: 11.8 MG/DL (ref 8–23)
CALCIUM SERPL-MCNC: 9.7 MG/DL (ref 8.8–10.2)
CHLORIDE SERPL-SCNC: 101 MMOL/L (ref 98–107)
CHOLEST SERPL-MCNC: 172 MG/DL
CREAT SERPL-MCNC: 0.77 MG/DL (ref 0.51–0.95)
DEPRECATED HCO3 PLAS-SCNC: 26 MMOL/L (ref 22–29)
GFR SERPL CREATININE-BSD FRML MDRD: 84 ML/MIN/1.73M2
GLUCOSE SERPL-MCNC: 96 MG/DL (ref 70–99)
HDLC SERPL-MCNC: 69 MG/DL
LDLC SERPL CALC-MCNC: 77 MG/DL
NONHDLC SERPL-MCNC: 103 MG/DL
POTASSIUM SERPL-SCNC: 4.2 MMOL/L (ref 3.4–5.3)
SODIUM SERPL-SCNC: 139 MMOL/L (ref 136–145)
TRIGL SERPL-MCNC: 130 MG/DL

## 2023-05-17 ENCOUNTER — LAB REQUISITION (OUTPATIENT)
Dept: LAB | Facility: CLINIC | Age: 68
End: 2023-05-17
Payer: COMMERCIAL

## 2023-05-17 DIAGNOSIS — I10 ESSENTIAL (PRIMARY) HYPERTENSION: ICD-10-CM

## 2023-05-17 PROCEDURE — 84132 ASSAY OF SERUM POTASSIUM: CPT | Mod: ORL | Performed by: FAMILY MEDICINE

## 2023-05-18 LAB — POTASSIUM SERPL-SCNC: 4.1 MMOL/L (ref 3.4–5.3)

## 2023-06-04 ENCOUNTER — HEALTH MAINTENANCE LETTER (OUTPATIENT)
Age: 68
End: 2023-06-04

## 2023-09-20 ENCOUNTER — LAB REQUISITION (OUTPATIENT)
Dept: LAB | Facility: CLINIC | Age: 68
End: 2023-09-20
Payer: COMMERCIAL

## 2023-09-20 DIAGNOSIS — I10 ESSENTIAL (PRIMARY) HYPERTENSION: ICD-10-CM

## 2023-09-20 DIAGNOSIS — E78.2 MIXED HYPERLIPIDEMIA: ICD-10-CM

## 2023-09-20 LAB
ANION GAP SERPL CALCULATED.3IONS-SCNC: 14 MMOL/L (ref 7–15)
BUN SERPL-MCNC: 15.7 MG/DL (ref 8–23)
CALCIUM SERPL-MCNC: 9.6 MG/DL (ref 8.8–10.2)
CHLORIDE SERPL-SCNC: 99 MMOL/L (ref 98–107)
CHOLEST SERPL-MCNC: 198 MG/DL
CREAT SERPL-MCNC: 0.72 MG/DL (ref 0.51–0.95)
DEPRECATED HCO3 PLAS-SCNC: 23 MMOL/L (ref 22–29)
EGFRCR SERPLBLD CKD-EPI 2021: >90 ML/MIN/1.73M2
GLUCOSE SERPL-MCNC: 93 MG/DL (ref 70–99)
HDLC SERPL-MCNC: 68 MG/DL
LDLC SERPL CALC-MCNC: 103 MG/DL
NONHDLC SERPL-MCNC: 130 MG/DL
POTASSIUM SERPL-SCNC: 4 MMOL/L (ref 3.4–5.3)
SODIUM SERPL-SCNC: 136 MMOL/L (ref 136–145)
TRIGL SERPL-MCNC: 133 MG/DL

## 2023-09-20 PROCEDURE — 80061 LIPID PANEL: CPT | Mod: ORL | Performed by: FAMILY MEDICINE

## 2023-09-20 PROCEDURE — 80048 BASIC METABOLIC PNL TOTAL CA: CPT | Mod: ORL | Performed by: FAMILY MEDICINE

## 2023-12-05 ENCOUNTER — OFFICE VISIT (OUTPATIENT)
Dept: DERMATOLOGY | Facility: CLINIC | Age: 68
End: 2023-12-05
Payer: COMMERCIAL

## 2023-12-05 DIAGNOSIS — Z86.018 HISTORY OF DYSPLASTIC NEVUS: Primary | ICD-10-CM

## 2023-12-05 DIAGNOSIS — B35.1 ONYCHOMYCOSIS: ICD-10-CM

## 2023-12-05 DIAGNOSIS — D22.9 MULTIPLE BENIGN MELANOCYTIC NEVI: ICD-10-CM

## 2023-12-05 PROCEDURE — 99214 OFFICE O/P EST MOD 30 MIN: CPT | Performed by: DERMATOLOGY

## 2023-12-05 RX ORDER — CICLOPIROX 80 MG/ML
SOLUTION TOPICAL
Qty: 6.6 ML | Refills: 11 | Status: SHIPPED | OUTPATIENT
Start: 2023-12-05

## 2023-12-05 NOTE — PROGRESS NOTES
[facility] Dermatology Note      Dermatology Problem List:  1. Hx of dysplastic nevi, removed by outside derm in [place]  2. Multiple clinically benign nevi  3. Mild hand dermatitis, resolved  - used OTC HC PRN  - used  triamcinolone 0.1% BID PRN  4. Seborrheic keratosis  5. Onychomycosis- penlac      Encounter Date: Dec 5, 2023    CC:   FBSE     History of Present Illness:  Ms. Kalie Hoang is a 67 year old female who has a h/o dysplastic nevi, removed by dermatologist in [place], presenting for yearly visit. No lesions of concern. Stopped using penlac for the nails, but not sure if this is making the nails worse. Wondering about treatment of dark circles under the eyes.     Past Medical History:   There is no problem list on file for this patient.    No past medical history on file.  No past surgical history on file.    Social History:  Patient reports that she has never smoked. She has never used smokeless tobacco.    Family History:  No family history on file.    Medications:  Current Outpatient Medications   Medication Sig Dispense Refill    Acetaminophen (TYLENOL PO) Take 500 mg by mouth as needed for mild pain or fever Takes 500-1000 mg  prn      ALPRAZolam (XANAX) 0.25 MG ODT 1/2 tab(s)      amLODIPine (NORVASC) 5 MG tablet       atorvastatin (LIPITOR) 20 MG tablet 10 mg       celecoxib (CELEBREX) 200 MG capsule       cetirizine (ZYRTEC) 10 MG tablet Take 10 mg by mouth daily      dicyclomine (BENTYL) 10 MG capsule Take 10 mg by mouth as needed      escitalopram (LEXAPRO) 10 MG tablet       esomeprazole (NEXIUM) 20 MG DR capsule       famotidine (PEPCID) 40 MG tablet Take 40 mg by mouth as needed for heartburn      Famotidine-Ca Carb-Mag Hydrox (PEPCID COMPLETE PO) Take by mouth as needed      hydrochlorothiazide (HYDRODIURIL) 12.5 MG tablet Take 25 mg by mouth daily      losartan (COZAAR) 50 MG tablet Take 50 mg by mouth daily      nystatin (MYCOSTATIN) 767978 UNIT/GM  external cream Twice daily to rash under breasts until clear. 30 g 3    RESTASIS 0.05 % ophthalmic emulsion       UNABLE TO FIND as needed lMEDICATION NAME:Lactaid Pills      UNABLE TO FIND as needed MEDICATION NAME: Gas X      ciclopirox (PENLAC) 8 % external solution Apply to adjacent skin and affected nails daily.  Remove with alcohol every 7 days, then repeat. (Patient not taking: Reported on 11/8/2022) 6.6 mL 3    triamcinolone (KENALOG) 0.1 % external ointment Twice daily to hand rash areas until clear, then as needed. Do not fill until pt calls. (Patient not taking: Reported on 11/8/2022) 80 g 3        Allergies   Allergen Reactions    Bactrim     Ciprofloxacin Muscle Pain (Myalgia)    Macrobid  [Nitrofurantoin Macrocrystal] Muscle Pain (Myalgia)    Codeine Palpitations       Review of Systems:  -Constitutional: Otherwise feeling well today, in usual state of health.  -HEENT: Patient denies nonhealing oral sores.  -Skin: As above in HPI. No additional skin concerns.  Patient in normal state of health and is feeling well on complete ROS. No fevers, cough, rash, GI upset, headaches, rhinorrhea, or other rashes.      Physical exam:  Vitals: There were no vitals taken for this visit.  GEN: This is a well developed, well-nourished female in no acute distress, in a pleasant mood.      SKIN: Total skin excluding the undergarment areas was performed. The exam included the head/face, neck, both arms, chest, back, abdomen, both legs, digits and/or nails.   - FP II  - Cherry red papules on the chest, back  - Scarring of midline anterior neck 2/2 previous emergent neck surgery  - Well-healed scars in areas of previous dysplastic nevi excisions (4 total)  - Multiple regular brown pigmented macules and papules on the trunk, extremities   - L medial breast with approx 8 mm brown papule with dark brown macules at the inferior aspect (unchanged)  - Scattered brown macules on sun exposed areas.  - There are waxy stuck on tan  to brown papules on the trunk, extremities, and breasts  - L great toenail plates with longitudinal leukonychia    Impression/Plan:  Benign pigmented nevi: No lesions of concern. Sun protection recommended. Discussed ABCDEs of malignant melanoma.    History of dysplastic nevus: No concerning nevi on exam.   Onychomycosis: Declines po treatment, restart Penlac. Chronic and not resolved.   Seborrheic keratoses: Benign hyperkeratotic papules and plaques. No treatment advised unless irritation occurs.    Darkness around the eyes. Reviewed cosmetic treatment options. Patient will treat with over the counter retinol. Noted that dryness and irritation may occur, so use 1-2 times per week with moisturizer.       Follow-up 1 year.    Tori Soto MD   of Dermatology  [facility]

## 2023-12-05 NOTE — LETTER
12/5/2023      RE: Kalie Hoang  [address]  [place] [address]     Dear Colleague,    Thank you for the opportunity to participate in the care of your patient, Kalie Hoang, at the [facility] at [facility]. Please see a copy of my visit note below.      [facility] Dermatology Note      Dermatology Problem List:  1. Hx of dysplastic nevi, removed by outside derm in [place]  2. Multiple clinically benign nevi  3. Mild hand dermatitis, resolved  - used OTC HC PRN  - used  triamcinolone 0.1% BID PRN  4. Seborrheic keratosis  5. Onychomycosis- penlac      Encounter Date: Dec 5, 2023    CC:   FBSE     History of Present Illness:  Ms. Kalie Hoang is a 67 year old female who has a h/o dysplastic nevi, removed by dermatologist in [place], presenting for yearly visit. No lesions of concern. Stopped using penlac for the nails, but not sure if this is making the nails worse. Wondering about treatment of dark circles under the eyes.     Past Medical History:   There is no problem list on file for this patient.    No past medical history on file.  No past surgical history on file.    Social History:  Patient reports that she has never smoked. She has never used smokeless tobacco.    Family History:  No family history on file.    Medications:  Current Outpatient Medications   Medication Sig Dispense Refill    Acetaminophen (TYLENOL PO) Take 500 mg by mouth as needed for mild pain or fever Takes 500-1000 mg  prn      ALPRAZolam (XANAX) 0.25 MG ODT 1/2 tab(s)      amLODIPine (NORVASC) 5 MG tablet       atorvastatin (LIPITOR) 20 MG tablet 10 mg       celecoxib (CELEBREX) 200 MG capsule       cetirizine (ZYRTEC) 10 MG tablet Take 10 mg by mouth daily      dicyclomine (BENTYL) 10 MG capsule Take 10 mg by mouth as needed      escitalopram (LEXAPRO) 10 MG tablet       esomeprazole (NEXIUM) 20 MG DR capsule        famotidine (PEPCID) 40 MG tablet Take 40 mg by mouth as needed for heartburn      Famotidine-Ca Carb-Mag Hydrox (PEPCID COMPLETE PO) Take by mouth as needed      hydrochlorothiazide (HYDRODIURIL) 12.5 MG tablet Take 25 mg by mouth daily      losartan (COZAAR) 50 MG tablet Take 50 mg by mouth daily      nystatin (MYCOSTATIN) [id number] UNIT/GM external cream Twice daily to rash under breasts until clear. 30 g 3    RESTASIS 0.05 % ophthalmic emulsion       UNABLE TO FIND as needed lMEDICATION NAME:Lactaid Pills      UNABLE TO FIND as needed MEDICATION NAME: Gas X      ciclopirox (PENLAC) 8 % external solution Apply to adjacent skin and affected nails daily.  Remove with alcohol every 7 days, then repeat. (Patient not taking: Reported on 11/8/2022) 6.6 mL 3    triamcinolone (KENALOG) 0.1 % external ointment Twice daily to hand rash areas until clear, then as needed. Do not fill until pt calls. (Patient not taking: Reported on 11/8/2022) 80 g 3        Allergies   Allergen Reactions    Bactrim     Ciprofloxacin Muscle Pain (Myalgia)    Macrobid  [Nitrofurantoin Macrocrystal] Muscle Pain (Myalgia)    Codeine Palpitations       Review of Systems:  -Constitutional: Otherwise feeling well today, in usual state of health.  -HEENT: Patient denies nonhealing oral sores.  -Skin: As above in HPI. No additional skin concerns.  Patient in normal state of health and is feeling well on complete ROS. No fevers, cough, rash, GI upset, headaches, rhinorrhea, or other rashes.      Physical exam:  Vitals: There were no vitals taken for this visit.  GEN: This is a well developed, well-nourished female in no acute distress, in a pleasant mood.      SKIN: Total skin excluding the undergarment areas was performed. The exam included the head/face, neck, both arms, chest, back, abdomen, both legs, digits and/or nails.   - FP II  - Cherry red papules on the chest, back  - Scarring of midline anterior neck 2/2 previous emergent neck surgery  -  Well-healed scars in areas of previous dysplastic nevi excisions (4 total)  - Multiple regular brown pigmented macules and papules on the trunk, extremities   - L medial breast with approx 8 mm brown papule with dark brown macules at the inferior aspect (unchanged)  - Scattered brown macules on sun exposed areas.  - There are waxy stuck on tan to brown papules on the trunk, extremities, and breasts  - L great toenail plates with longitudinal leukonychia    Impression/Plan:  Benign pigmented nevi: No lesions of concern. Sun protection recommended. Discussed ABCDEs of malignant melanoma.    History of dysplastic nevus: No concerning nevi on exam.   Onychomycosis: Declines po treatment, restart Penlac. Chronic and not resolved.   Seborrheic keratoses: Benign hyperkeratotic papules and plaques. No treatment advised unless irritation occurs.    Darkness around the eyes. Reviewed cosmetic treatment options. Patient will treat with over the counter retinol. Noted that dryness and irritation may occur, so use 1-2 times per week with moisturizer.       Follow-up 1 year.    Tori Soto MD   of Dermatology  [facility]

## 2023-12-17 ENCOUNTER — HEALTH MAINTENANCE LETTER (OUTPATIENT)
Age: 68
End: 2023-12-17

## 2024-01-02 ENCOUNTER — TELEPHONE (OUTPATIENT)
Dept: DERMATOLOGY | Facility: CLINIC | Age: 69
End: 2024-01-02

## 2024-01-02 NOTE — TELEPHONE ENCOUNTER
Prior Authorization Retail Medication Request    Medication/Dose: ciclopirox (PENLAC) 8 % external solution  Diagnosis and ICD code (if different than what is on RX):  N/A   New/renewal/insurance change PA/secondary ins. PA:  Previously Tried and Failed:  ciclopirox (PENLAC) 8 % external solution   Rationale:  Onychomycosis     Insurance   Primary: [employer]   Insurance ID:    [id number]     Secondary (if applicable):N/A  Insurance ID:  N/A    Pharmacy Information (if different than what is on RX)  Name:  [facility]      Phone:  [phone]  Fax:[phone]    Routed to [facility] Jus Banks MA

## 2024-01-03 NOTE — TELEPHONE ENCOUNTER
PRIOR AUTHORIZATION DENIED    Medication: CICLOPIROX 8 % EX SOLN    Insurance Company: [employer] Part D - Phone [phone] Fax [phone]    Denial Date: 1/3/2024    Denial Reason(s): Diagnosis needs to be confirmed by one of the following: positive potassium hydroxide test, fungal culture or histology.     Appeal Information:

## 2024-01-03 NOTE — TELEPHONE ENCOUNTER
PA Initiation    Medication: CICLOPIROX 8 % EX SOLN  Insurance Company: [employer] system Part D - Phone [phone] Fax [phone]  Pharmacy Filling the Rx: [facility] - [place], [place] - [address] [facility]  Filling Pharmacy Phone: [phone]  Filling Pharmacy Fax: [phone]  Start Date: 1/3/2024

## 2024-07-14 ENCOUNTER — HEALTH MAINTENANCE LETTER (OUTPATIENT)
Age: 69
End: 2024-07-14

## 2024-09-11 ENCOUNTER — LAB REQUISITION (OUTPATIENT)
Dept: LAB | Facility: CLINIC | Age: 69
End: 2024-09-11
Payer: COMMERCIAL

## 2024-09-11 DIAGNOSIS — I10 ESSENTIAL (PRIMARY) HYPERTENSION: ICD-10-CM

## 2024-09-11 DIAGNOSIS — E78.2 MIXED HYPERLIPIDEMIA: ICD-10-CM

## 2024-09-11 DIAGNOSIS — R63.4 ABNORMAL WEIGHT LOSS: ICD-10-CM

## 2024-09-11 PROCEDURE — 84443 ASSAY THYROID STIM HORMONE: CPT | Mod: ORL | Performed by: FAMILY MEDICINE

## 2024-09-11 PROCEDURE — 80048 BASIC METABOLIC PNL TOTAL CA: CPT | Mod: ORL | Performed by: FAMILY MEDICINE

## 2024-09-11 PROCEDURE — 80061 LIPID PANEL: CPT | Mod: ORL | Performed by: FAMILY MEDICINE

## 2024-09-12 LAB
ANION GAP SERPL CALCULATED.3IONS-SCNC: 12 MMOL/L (ref 7–15)
BUN SERPL-MCNC: 10.3 MG/DL (ref 8–23)
CALCIUM SERPL-MCNC: 9.8 MG/DL (ref 8.8–10.4)
CHLORIDE SERPL-SCNC: 101 MMOL/L (ref 98–107)
CHOLEST SERPL-MCNC: 163 MG/DL
CREAT SERPL-MCNC: 0.77 MG/DL (ref 0.51–0.95)
EGFRCR SERPLBLD CKD-EPI 2021: 83 ML/MIN/1.73M2
FASTING STATUS PATIENT QL REPORTED: NORMAL
FASTING STATUS PATIENT QL REPORTED: NORMAL
GLUCOSE SERPL-MCNC: 87 MG/DL (ref 70–99)
HCO3 SERPL-SCNC: 27 MMOL/L (ref 22–29)
HDLC SERPL-MCNC: 63 MG/DL
LDLC SERPL CALC-MCNC: 77 MG/DL
NONHDLC SERPL-MCNC: 100 MG/DL
POTASSIUM SERPL-SCNC: 4.3 MMOL/L (ref 3.4–5.3)
SODIUM SERPL-SCNC: 140 MMOL/L (ref 135–145)
TRIGL SERPL-MCNC: 116 MG/DL
TSH SERPL DL<=0.005 MIU/L-ACNC: 2.43 UIU/ML (ref 0.3–4.2)

## 2024-12-10 ENCOUNTER — OFFICE VISIT (OUTPATIENT)
Dept: DERMATOLOGY | Facility: CLINIC | Age: 69
End: 2024-12-10
Payer: COMMERCIAL

## 2024-12-10 DIAGNOSIS — Z86.018 HISTORY OF DYSPLASTIC NEVUS: ICD-10-CM

## 2024-12-10 DIAGNOSIS — L82.0 INFLAMED SEBORRHEIC KERATOSIS: ICD-10-CM

## 2024-12-10 DIAGNOSIS — L90.5 SCAR: ICD-10-CM

## 2024-12-10 DIAGNOSIS — D22.9 MULTIPLE BENIGN MELANOCYTIC NEVI: ICD-10-CM

## 2024-12-10 DIAGNOSIS — L01.00 IMPETIGO: Primary | ICD-10-CM

## 2024-12-10 DIAGNOSIS — D49.2 SKIN NEOPLASM: ICD-10-CM

## 2024-12-10 RX ORDER — LIDOCAINE HYDROCHLORIDE AND EPINEPHRINE 10; 10 MG/ML; UG/ML
3 INJECTION, SOLUTION INFILTRATION; PERINEURAL ONCE
Status: COMPLETED | OUTPATIENT
Start: 2024-12-10 | End: 2024-12-10

## 2024-12-10 RX ORDER — MUPIROCIN 20 MG/G
OINTMENT TOPICAL
Qty: 22 G | Refills: 0 | Status: SHIPPED | OUTPATIENT
Start: 2024-12-10

## 2024-12-10 RX ORDER — MUPIROCIN 20 MG/G
OINTMENT TOPICAL
Qty: 22 G | Refills: 0 | Status: SHIPPED | OUTPATIENT
Start: 2024-12-10 | End: 2024-12-10

## 2024-12-10 RX ADMIN — LIDOCAINE HYDROCHLORIDE AND EPINEPHRINE 3 ML: 10; 10 INJECTION, SOLUTION INFILTRATION; PERINEURAL at 01:49

## 2024-12-10 NOTE — LETTER
12/10/2024      RE: Kalie Hoang  [address]  [place] [address]     Dear Colleague,    Thank you for the opportunity to participate in the care of your patient, Kalie Hoang, at the [facility] at [facility]. Please see a copy of my visit note below.      [facility] Dermatology Note      Dermatology Problem List:  1. Hx of dysplastic nevi, removed by outside derm in [place]  2. Multiple clinically benign nevi  3. Mild hand dermatitis, resolved  - used OTC HC PRN  - used  triamcinolone 0.1% BID PRN  4. Seborrheic keratosis  5. Onychomycosis- penlac- not covered by insurance without nail clipping which patient declines      Encounter Date: Dec 10, 2024    Impression/Plan:  Benign pigmented nevi: No lesions of concern. Sun protection recommended. Discussed ABCDEs of malignant melanoma.    History of dysplastic nevus: No concerning nevi on exam.   Onychomycosis: Insurance requires clipping for Penlac. Patient declines.   Seborrheic keratoses: Benign hyperkeratotic papules and plaques. No treatment advised unless irritation occurs.    Erosions on the R upper cutaneous lip: Favor HSV with secondary impetigo. Start mupirocin BID. Low value to valtrex at this time. HSV 1/2 pcr and bacterial cx.   Neoplasm of uncertain behavior on the L mid back: Irregular network. Biopsy today.   Shave biopsy:  After discussion of benefits and risks including but not limited to bleeding/bruising, pain/swelling, infection, scar, incomplete removal, nerve damage/numbness, recurrence, and non-diagnostic biopsy, written consent, verbal consent and photographs were obtained. Time-out was performed. The area was cleaned with isopropyl alcohol. 1 mL of 1% lidocaine with epinephrine was injected to obtain adequate anesthesia of the lesion on the L mid lateral back. A shave biopsy was performed. Hemostasis was achieved with aluminium  chloride. Vaseline and a sterile dressing were applied. The patient tolerated the procedure and no complications were noted. The patient was provided with verbal and written post care instructions.   7.   Scar on the anterior neck: Past saline injections by derm in [place]. Fractionate laser may be of benefit. I would recommend Zel Skin if patient is interested in that treatment option.   8. Irritated seborrheic keratosis x1: Lesion(s) on the R forearm treated with liquid nitrogen x2 10 sec freeze cycles. Wound info provided. Discussed risks of pain, blistering, scar, incomplete removal.      Follow-up 1 year.    Tori Soto MD   of Dermatology  [facility]    _______________________________________  _______________________________________'    History of Present Illness:  Ms. Kalie Hoang is a 68 year old female who has a h/o dysplastic nevi, removed by dermatologist in [place], presenting for yearly visit. Notes an eruption on the R upper lip starting with blistering about a week ago. Now crusting. Improved with neosporin. Has a scaling bump on the R forearm that gets irritated that she would like addressed.       Medications:  Current Outpatient Medications   Medication Sig Dispense Refill     Acetaminophen (TYLENOL PO) Take 500 mg by mouth as needed for mild pain or fever Takes 500-1000 mg  prn       ALPRAZolam (XANAX) 0.25 MG ODT 1/2 tab(s)       amLODIPine (NORVASC) 5 MG tablet        atorvastatin (LIPITOR) 20 MG tablet 10 mg        celecoxib (CELEBREX) 200 MG capsule        cetirizine (ZYRTEC) 10 MG tablet Take 10 mg by mouth daily       ciclopirox (PENLAC) 8 % external solution Apply to adjacent skin and affected nails daily.  Remove with alcohol every 7 days, then repeat. 6.6 mL 11     dicyclomine (BENTYL) 10 MG capsule Take 10 mg by mouth as needed       escitalopram (LEXAPRO) 10 MG tablet        esomeprazole (NEXIUM) 20 MG DR capsule        famotidine (PEPCID) 40 MG  tablet Take 40 mg by mouth as needed for heartburn       Famotidine-Ca Carb-Mag Hydrox (PEPCID COMPLETE PO) Take by mouth as needed       hydrochlorothiazide (HYDRODIURIL) 12.5 MG tablet Take 25 mg by mouth daily       losartan (COZAAR) 50 MG tablet Take 50 mg by mouth daily       nystatin (MYCOSTATIN) 918125 UNIT/GM external cream Twice daily to rash under breasts until clear. 30 g 3     RESTASIS 0.05 % ophthalmic emulsion        triamcinolone (KENALOG) 0.1 % external ointment Twice daily to hand rash areas until clear, then as needed. Do not fill until pt calls. (Patient not taking: Reported on 11/8/2022) 80 g 3     UNABLE TO FIND as needed lMEDICATION NAME:Lactaid Pills       UNABLE TO FIND as needed MEDICATION NAME: Gas X          Allergies   Allergen Reactions     Bactrim      Ciprofloxacin Muscle Pain (Myalgia)     Macrobid  [Nitrofurantoin Macrocrystal] Muscle Pain (Myalgia)     Codeine Palpitations       Physical exam:  Vitals: There were no vitals taken for this visit.  GEN: This is a well developed, well-nourished female in no acute distress, in a pleasant mood.      SKIN: Total skin excluding the undergarment areas was performed. The exam included the head/face, neck, both arms, chest, back, abdomen, both legs, digits and/or nails.   - FP II  - Cherry red papules on the chest, back  - Scarring of midline anterior neck 2/2 previous emergent neck surgery  - Well-healed scars in areas of previous dysplastic nevi excisions (4 total)  - Multiple regular brown pigmented macules and papules on the trunk, extremities   - L medial lateral back with 3 mm dark brown macule with thickened reticulate network at lateral edge  - L medial breast with approx 8 mm brown papule with dark brown macules at the inferior aspect (unchanged)  - Scattered brown macules on sun exposed areas.  - Waxy hyperkeratotic papule on the R forearm  - There are waxy stuck on tan to brown papules on the trunk, extremities, and breasts  - L  great toenail plates with distal onycholysis  - Circular erosions on the R upper cutaneous lip with crusting          Please do not hesitate to contact me if you have any questions/concerns.     Sincerely,       Tori Soto MD

## 2024-12-10 NOTE — PROGRESS NOTES
[facility] Dermatology Note      Dermatology Problem List:  1. Hx of dysplastic nevi, removed by outside derm in [place]  2. Multiple clinically benign nevi  3. Mild hand dermatitis, resolved  - used OTC HC PRN  - used  triamcinolone 0.1% BID PRN  4. Seborrheic keratosis  5. Onychomycosis- penlac- not covered by insurance without nail clipping which patient declines      Encounter Date: Dec 10, 2024    Impression/Plan:  Benign pigmented nevi: No lesions of concern. Sun protection recommended. Discussed ABCDEs of malignant melanoma.    History of dysplastic nevus: No concerning nevi on exam.   Onychomycosis: Insurance requires clipping for Penlac. Patient declines.   Seborrheic keratoses: Benign hyperkeratotic papules and plaques. No treatment advised unless irritation occurs.    Erosions on the R upper cutaneous lip: Favor HSV with secondary impetigo. Start mupirocin BID. Low value to valtrex at this time. HSV 1/2 pcr and bacterial cx.   Neoplasm of uncertain behavior on the L mid back: Irregular network. Biopsy today.   Shave biopsy:  After discussion of benefits and risks including but not limited to bleeding/bruising, pain/swelling, infection, scar, incomplete removal, nerve damage/numbness, recurrence, and non-diagnostic biopsy, written consent, verbal consent and photographs were obtained. Time-out was performed. The area was cleaned with isopropyl alcohol. 1 mL of 1% lidocaine with epinephrine was injected to obtain adequate anesthesia of the lesion on the L mid lateral back. A shave biopsy was performed. Hemostasis was achieved with aluminium chloride. Vaseline and a sterile dressing were applied. The patient tolerated the procedure and no complications were noted. The patient was provided with verbal and written post care instructions.   7.   Scar on the anterior neck: Past saline injections by derm in [place]. Fractionate laser may be of benefit. I would recommend Zel Skin if patient is  interested in that treatment option.   8. Irritated seborrheic keratosis x1: Lesion(s) on the R forearm treated with liquid nitrogen x2 10 sec freeze cycles. Wound info provided. Discussed risks of pain, blistering, scar, incomplete removal.      Follow-up 1 year.    Tori Soto MD   of Dermatology  [facility]    _______________________________________  _______________________________________'    History of Present Illness:  Ms. Kalie Hoang is a 68 year old female who has a h/o dysplastic nevi, removed by dermatologist in [place], presenting for yearly visit. Notes an eruption on the R upper lip starting with blistering about a week ago. Now crusting. Improved with neosporin. Has a scaling bump on the R forearm that gets irritated that she would like addressed.       Medications:  Current Outpatient Medications   Medication Sig Dispense Refill    Acetaminophen (TYLENOL PO) Take 500 mg by mouth as needed for mild pain or fever Takes 500-1000 mg  prn      ALPRAZolam (XANAX) 0.25 MG ODT 1/2 tab(s)      amLODIPine (NORVASC) 5 MG tablet       atorvastatin (LIPITOR) 20 MG tablet 10 mg       celecoxib (CELEBREX) 200 MG capsule       cetirizine (ZYRTEC) 10 MG tablet Take 10 mg by mouth daily      ciclopirox (PENLAC) 8 % external solution Apply to adjacent skin and affected nails daily.  Remove with alcohol every 7 days, then repeat. 6.6 mL 11    dicyclomine (BENTYL) 10 MG capsule Take 10 mg by mouth as needed      escitalopram (LEXAPRO) 10 MG tablet       esomeprazole (NEXIUM) 20 MG DR capsule       famotidine (PEPCID) 40 MG tablet Take 40 mg by mouth as needed for heartburn      Famotidine-Ca Carb-Mag Hydrox (PEPCID COMPLETE PO) Take by mouth as needed      hydrochlorothiazide (HYDRODIURIL) 12.5 MG tablet Take 25 mg by mouth daily      losartan (COZAAR) 50 MG tablet Take 50 mg by mouth daily      nystatin (MYCOSTATIN) 275650 UNIT/GM external cream Twice daily to rash under  breasts until clear. 30 g 3    RESTASIS 0.05 % ophthalmic emulsion       triamcinolone (KENALOG) 0.1 % external ointment Twice daily to hand rash areas until clear, then as needed. Do not fill until pt calls. (Patient not taking: Reported on 11/8/2022) 80 g 3    UNABLE TO FIND as needed lMEDICATION NAME:Lactaid Pills      UNABLE TO FIND as needed MEDICATION NAME: Gas X          Allergies   Allergen Reactions    Bactrim     Ciprofloxacin Muscle Pain (Myalgia)    Macrobid  [Nitrofurantoin Macrocrystal] Muscle Pain (Myalgia)    Codeine Palpitations       Physical exam:  Vitals: There were no vitals taken for this visit.  GEN: This is a well developed, well-nourished female in no acute distress, in a pleasant mood.      SKIN: Total skin excluding the undergarment areas was performed. The exam included the head/face, neck, both arms, chest, back, abdomen, both legs, digits and/or nails.   - FP II  - Cherry red papules on the chest, back  - Scarring of midline anterior neck 2/2 previous emergent neck surgery  - Well-healed scars in areas of previous dysplastic nevi excisions (4 total)  - Multiple regular brown pigmented macules and papules on the trunk, extremities   - L medial lateral back with 3 mm dark brown macule with thickened reticulate network at lateral edge  - L medial breast with approx 8 mm brown papule with dark brown macules at the inferior aspect (unchanged)  - Scattered brown macules on sun exposed areas.  - Waxy hyperkeratotic papule on the R forearm  - There are waxy stuck on tan to brown papules on the trunk, extremities, and breasts  - L great toenail plates with distal onycholysis  - Circular erosions on the R upper cutaneous lip with crusting

## 2024-12-10 NOTE — NURSING NOTE
The following medication was given:     MEDICATION: 8.4 % Sodium Bicarbonate   ROUTE: IM  SITE: per provider   DOSE: 0.25 mL  LOT #: [id number]  :  [employer]   EXPIRATION DATE:  08/2026  NDC#:[address]-5   Margie Banks MA

## 2024-12-12 LAB
BACTERIA WND CULT: NORMAL
HSV1 DNA SPEC QL NAA+PROBE: DETECTED
HSV2 DNA SPEC QL NAA+PROBE: NOT DETECTED
SPECIMEN TYPE: ABNORMAL

## 2024-12-14 LAB
BACTERIA WND CULT: ABNORMAL
BACTERIA WND CULT: ABNORMAL

## 2024-12-16 LAB
PATH REPORT.COMMENTS IMP SPEC: NORMAL
PATH REPORT.FINAL DX SPEC: NORMAL
PATH REPORT.GROSS SPEC: NORMAL
PATH REPORT.MICROSCOPIC SPEC OTHER STN: NORMAL
PATH REPORT.RELEVANT HX SPEC: NORMAL

## 2024-12-17 ENCOUNTER — OFFICE VISIT (OUTPATIENT)
Dept: DERMATOLOGY | Facility: CLINIC | Age: 69
End: 2024-12-17
Payer: COMMERCIAL

## 2024-12-17 VITALS
OXYGEN SATURATION: 97 % | HEIGHT: 62 IN | BODY MASS INDEX: 26.37 KG/M2 | HEART RATE: 65 BPM | SYSTOLIC BLOOD PRESSURE: 151 MMHG | DIASTOLIC BLOOD PRESSURE: 85 MMHG | WEIGHT: 143.3 LBS

## 2024-12-17 DIAGNOSIS — D49.2 SKIN NEOPLASM: ICD-10-CM

## 2024-12-17 DIAGNOSIS — D23.9 DYSPLASTIC NEVUS: ICD-10-CM

## 2024-12-17 PROCEDURE — 11401 EXC TR-EXT B9+MARG 0.6-1 CM: CPT | Mod: 79 | Performed by: DERMATOLOGY

## 2024-12-17 PROCEDURE — 12032 INTMD RPR S/A/T/EXT 2.6-7.5: CPT | Mod: 79 | Performed by: DERMATOLOGY

## 2024-12-17 RX ORDER — LIDOCAINE HYDROCHLORIDE AND EPINEPHRINE 10; 10 MG/ML; UG/ML
3 INJECTION, SOLUTION INFILTRATION; PERINEURAL ONCE
Status: COMPLETED | OUTPATIENT
Start: 2024-12-17 | End: 2024-12-17

## 2024-12-17 RX ADMIN — LIDOCAINE HYDROCHLORIDE AND EPINEPHRINE 3 ML: 10; 10 INJECTION, SOLUTION INFILTRATION; PERINEURAL at 17:12

## 2024-12-17 NOTE — LETTER
2024      RE: Kalie Hoang  [address]  [place] [address]     Dear Colleague,    Thank you for the opportunity to participate in the care of your patient, Kalie Hoang, at the [facility] at [facility]. Please see a copy of my visit note below.    [facility] Dermatology Note        Dermatology Problem List:  1. Hx of dysplastic nevi, removed by outside derm in [place]  2. Multiple clinically benign nevi  3. Mild hand dermatitis, resolved  - used OTC HC PRN  - used  triamcinolone 0.1% BID PRN  4. Seborrheic keratosis  5. Onychomycosis- penlac- not covered by insurance without nail clipping which patient declines       Dermatologic Procedure Note        Patient Name:  Kalie Hoang  Patient :  1955  Medical Record #: [id number]  Date of Operation: 2024        SURGEON:  Tori Soto MD    ASSISTANT:  Margie Banks    PREOPERATIVE DIAGNOSIS:  Severely dysplastic nevus    POSTOPERATIVE DIAGNOSIS:  Same    INDICATION: Excision of Severely dysplastic nevus     PROCEDURE PERFORMED:  1. Excise severely dysplastic nevus   2. Intermediate closure       PROCEDURE:  After discussion of risks and benefits of the procedure including the presence of a scar, bleeding, incomplete removal, recurrence, and infection following the procedure, written consent was obtained from the patient.  The patient was placed in the prone position and the lesion on the L lateral mid back was delineated by a marking pen. Local anesthesia included Xylocaine 1% with epinephrine 1:200,000 for a total of 6 ml. The area was cleansed with PCMX and draped in the usual sterile fashion.    Excision was performed using a 15 blade with 5 mm margins on all sides of the lesion. Excision was performed down to subcutaneous fat. Specimen was sent in formalin to pathology.    The wound was closed with 5  Vicryl deep  buried sutures. Wound edges were approximated with running subcuticular 4-0 prolene sutures. Steris placed.     Lesion size: 6 mm  Margins: 5 mm  Final wound length: 3.5 cm    The wound was dressed with telfa, pressure dressing and tegaderm  Patient was advised of wound care and healing and instructed to contact us with any concerns. Suture removal was planned in 14 days.  Limitation of activity was discussed in detail.      There were no complications to the procedure or abnormal findings.    Tori Soto MD   of Dermatology          Please do not hesitate to contact me if you have any questions/concerns.     Sincerely,       Tori Soto MD

## 2024-12-17 NOTE — PROGRESS NOTES
[facility] Dermatology Note        Dermatology Problem List:  1. Hx of dysplastic nevi, removed by outside derm in [place]  2. Multiple clinically benign nevi  3. Mild hand dermatitis, resolved  - used OTC HC PRN  - used  triamcinolone 0.1% BID PRN  4. Seborrheic keratosis  5. Onychomycosis- penlac- not covered by insurance without nail clipping which patient declines       Dermatologic Procedure Note        Patient Name:  Kalie Hoang  Patient :  1955  Medical Record #: [id number]  Date of Operation: 2024        SURGEON:  Tori Soto MD    ASSISTANT:  Margie Banks    PREOPERATIVE DIAGNOSIS:  Severely dysplastic nevus    POSTOPERATIVE DIAGNOSIS:  Same    INDICATION: Excision of Severely dysplastic nevus     PROCEDURE PERFORMED:  1. Excise severely dysplastic nevus   2. Intermediate closure       PROCEDURE:  After discussion of risks and benefits of the procedure including the presence of a scar, bleeding, incomplete removal, recurrence, and infection following the procedure, written consent was obtained from the patient.  The patient was placed in the prone position and the lesion on the L lateral mid back was delineated by a marking pen. Local anesthesia included Xylocaine 1% with epinephrine 1:200,000 for a total of 6 ml. The area was cleansed with PCMX and draped in the usual sterile fashion.    Excision was performed using a 15 blade with 5 mm margins on all sides of the lesion. Excision was performed down to subcutaneous fat. Specimen was sent in formalin to pathology.    The wound was closed with 5  Vicryl deep buried sutures. Wound edges were approximated with running subcuticular 4-0 prolene sutures. Steris placed.     Lesion size: 6 mm  Margins: 5 mm  Final wound length: 3.5 cm    The wound was dressed with telfa, pressure dressing and tegaderm  Patient was advised of wound care and healing and instructed to contact us with any concerns.  Suture removal was planned in 14 days.  Limitation of activity was discussed in detail.      There were no complications to the procedure or abnormal findings.    Tori Soto MD   of Dermatology

## 2024-12-18 ENCOUNTER — MYC MEDICAL ADVICE (OUTPATIENT)
Dept: DERMATOLOGY | Facility: CLINIC | Age: 69
End: 2024-12-18
Payer: COMMERCIAL

## 2024-12-21 LAB
PATH REPORT.COMMENTS IMP SPEC: NORMAL
PATH REPORT.COMMENTS IMP SPEC: NORMAL
PATH REPORT.FINAL DX SPEC: NORMAL
PATH REPORT.GROSS SPEC: NORMAL
PATH REPORT.MICROSCOPIC SPEC OTHER STN: NORMAL
PATH REPORT.RELEVANT HX SPEC: NORMAL

## 2024-12-31 ENCOUNTER — OFFICE VISIT (OUTPATIENT)
Dept: DERMATOLOGY | Facility: CLINIC | Age: 69
End: 2024-12-31
Payer: COMMERCIAL

## 2024-12-31 DIAGNOSIS — Z48.02 ENCOUNTER FOR REMOVAL OF SUTURES: Primary | ICD-10-CM

## 2024-12-31 NOTE — LETTER
12/31/2024      RE: Kalie Hoang  [address]  [place] [address]     Dear Colleague,    Thank you for the opportunity to participate in the care of your patient, Kalie Hoang[facility]. Please see a copy of my visit note below.    Kalie Hoang presents to the clinic for removal of sutures. The patient has had sutures in place for 14   days. There has been no patient reported signs or symptoms of infection or drainage. Running subcuticular  sutures are seen and located on the L upper back.  All sutures were easily removed today. Routine wound care discussed.     Tori Soto MD   of Dermatology  [facility]        Please do not hesitate to contact me if you have any questions/concerns.     Sincerely,       Tori Soto MD

## 2024-12-31 NOTE — PROGRESS NOTES
Kalie Hoang presents to the clinic for removal of sutures. The patient has had sutures in place for 14   days. There has been no patient reported signs or symptoms of infection or drainage. Running subcuticular  sutures are seen and located on the L upper back.  All sutures were easily removed today. Routine wound care discussed.     Tori Soto MD   of Dermatology  [facility]

## 2025-03-19 ENCOUNTER — LAB REQUISITION (OUTPATIENT)
Dept: LAB | Facility: CLINIC | Age: 70
End: 2025-03-19
Payer: COMMERCIAL

## 2025-03-19 DIAGNOSIS — J02.9 ACUTE PHARYNGITIS, UNSPECIFIED: ICD-10-CM

## 2025-03-19 PROCEDURE — 87081 CULTURE SCREEN ONLY: CPT | Mod: ORL

## 2025-03-22 LAB — BACTERIA SPEC CULT: NORMAL

## 2025-07-19 ENCOUNTER — HEALTH MAINTENANCE LETTER (OUTPATIENT)
Age: 70
End: 2025-07-19

## 2025-08-20 ENCOUNTER — LAB REQUISITION (OUTPATIENT)
Dept: LAB | Facility: CLINIC | Age: 70
End: 2025-08-20
Payer: COMMERCIAL

## 2025-08-20 DIAGNOSIS — I10 ESSENTIAL (PRIMARY) HYPERTENSION: ICD-10-CM

## 2025-08-20 DIAGNOSIS — E78.2 MIXED HYPERLIPIDEMIA: ICD-10-CM

## 2025-08-21 LAB
ANION GAP SERPL CALCULATED.3IONS-SCNC: 11 MMOL/L (ref 7–15)
BUN SERPL-MCNC: 12.8 MG/DL (ref 8–23)
CALCIUM SERPL-MCNC: 9.8 MG/DL (ref 8.8–10.4)
CHLORIDE SERPL-SCNC: 100 MMOL/L (ref 98–107)
CHOLEST SERPL-MCNC: 185 MG/DL
CREAT SERPL-MCNC: 0.75 MG/DL (ref 0.51–0.95)
EGFRCR SERPLBLD CKD-EPI 2021: 85 ML/MIN/1.73M2
FASTING STATUS PATIENT QL REPORTED: ABNORMAL
FASTING STATUS PATIENT QL REPORTED: ABNORMAL
GLUCOSE SERPL-MCNC: 106 MG/DL (ref 70–99)
HCO3 SERPL-SCNC: 27 MMOL/L (ref 22–29)
HDLC SERPL-MCNC: 68 MG/DL
LDLC SERPL CALC-MCNC: 81 MG/DL
NONHDLC SERPL-MCNC: 117 MG/DL
POTASSIUM SERPL-SCNC: 3.9 MMOL/L (ref 3.4–5.3)
SODIUM SERPL-SCNC: 138 MMOL/L (ref 135–145)
TRIGL SERPL-MCNC: 178 MG/DL